# Patient Record
Sex: MALE | Race: WHITE | NOT HISPANIC OR LATINO
[De-identification: names, ages, dates, MRNs, and addresses within clinical notes are randomized per-mention and may not be internally consistent; named-entity substitution may affect disease eponyms.]

---

## 2018-08-31 ENCOUNTER — RX RENEWAL (OUTPATIENT)
Age: 63
End: 2018-08-31

## 2018-08-31 PROBLEM — Z00.00 ENCOUNTER FOR PREVENTIVE HEALTH EXAMINATION: Status: ACTIVE | Noted: 2018-08-31

## 2018-10-02 ENCOUNTER — RESULT CHARGE (OUTPATIENT)
Age: 63
End: 2018-10-02

## 2018-10-02 ENCOUNTER — APPOINTMENT (OUTPATIENT)
Dept: ENDOCRINOLOGY | Facility: CLINIC | Age: 63
End: 2018-10-02
Payer: COMMERCIAL

## 2018-10-02 VITALS
WEIGHT: 216 LBS | SYSTOLIC BLOOD PRESSURE: 130 MMHG | HEIGHT: 67 IN | BODY MASS INDEX: 33.9 KG/M2 | DIASTOLIC BLOOD PRESSURE: 80 MMHG | HEART RATE: 74 BPM

## 2018-10-02 DIAGNOSIS — Z86.39 PERSONAL HISTORY OF OTHER ENDOCRINE, NUTRITIONAL AND METABOLIC DISEASE: ICD-10-CM

## 2018-10-02 DIAGNOSIS — Z87.19 PERSONAL HISTORY OF OTHER DISEASES OF THE DIGESTIVE SYSTEM: ICD-10-CM

## 2018-10-02 LAB
GLUCOSE BLDC GLUCOMTR-MCNC: 153
HBA1C MFR BLD HPLC: 7.1
LDLC SERPL CALC-MCNC: 46
MICROALBUMIN/CREAT 24H UR-RTO: 9

## 2018-10-02 PROCEDURE — 99214 OFFICE O/P EST MOD 30 MIN: CPT | Mod: 25

## 2018-10-02 PROCEDURE — 82962 GLUCOSE BLOOD TEST: CPT

## 2018-11-07 ENCOUNTER — RX RENEWAL (OUTPATIENT)
Age: 63
End: 2018-11-07

## 2018-11-26 ENCOUNTER — RX RENEWAL (OUTPATIENT)
Age: 63
End: 2018-11-26

## 2018-12-06 ENCOUNTER — MEDICATION RENEWAL (OUTPATIENT)
Age: 63
End: 2018-12-06

## 2019-01-22 ENCOUNTER — RECORD ABSTRACTING (OUTPATIENT)
Age: 64
End: 2019-01-22

## 2019-01-22 DIAGNOSIS — Z80.9 FAMILY HISTORY OF MALIGNANT NEOPLASM, UNSPECIFIED: ICD-10-CM

## 2019-01-22 DIAGNOSIS — Z83.3 FAMILY HISTORY OF DIABETES MELLITUS: ICD-10-CM

## 2019-01-22 DIAGNOSIS — Z78.9 OTHER SPECIFIED HEALTH STATUS: ICD-10-CM

## 2019-01-22 DIAGNOSIS — Z87.891 PERSONAL HISTORY OF NICOTINE DEPENDENCE: ICD-10-CM

## 2019-01-22 DIAGNOSIS — Z83.49 FAMILY HISTORY OF OTHER ENDOCRINE, NUTRITIONAL AND METABOLIC DISEASES: ICD-10-CM

## 2019-01-22 DIAGNOSIS — N52.9 MALE ERECTILE DYSFUNCTION, UNSPECIFIED: ICD-10-CM

## 2019-02-05 ENCOUNTER — APPOINTMENT (OUTPATIENT)
Dept: ENDOCRINOLOGY | Facility: CLINIC | Age: 64
End: 2019-02-05

## 2019-04-02 ENCOUNTER — RESULT CHARGE (OUTPATIENT)
Age: 64
End: 2019-04-02

## 2019-04-02 ENCOUNTER — APPOINTMENT (OUTPATIENT)
Dept: ENDOCRINOLOGY | Facility: CLINIC | Age: 64
End: 2019-04-02
Payer: COMMERCIAL

## 2019-04-02 VITALS
WEIGHT: 207 LBS | BODY MASS INDEX: 32.49 KG/M2 | HEART RATE: 89 BPM | DIASTOLIC BLOOD PRESSURE: 76 MMHG | SYSTOLIC BLOOD PRESSURE: 130 MMHG | HEIGHT: 67 IN

## 2019-04-02 DIAGNOSIS — N52.9 MALE ERECTILE DYSFUNCTION, UNSPECIFIED: ICD-10-CM

## 2019-04-02 LAB — GLUCOSE BLDC GLUCOMTR-MCNC: 193

## 2019-04-02 PROCEDURE — 82962 GLUCOSE BLOOD TEST: CPT

## 2019-04-02 PROCEDURE — 99214 OFFICE O/P EST MOD 30 MIN: CPT | Mod: 25

## 2019-04-02 RX ORDER — SILDENAFIL 100 MG/1
100 TABLET, FILM COATED ORAL
Qty: 8 | Refills: 3 | Status: ACTIVE | COMMUNITY
Start: 2019-04-02 | End: 1900-01-01

## 2019-04-02 NOTE — ASSESSMENT
[FreeTextEntry1] : DM type 2, treated with insulin, trulicity and metformin, good control with improvement on the higher dose of trulicity\par Hyperlipidemia higher than usual due to lapse in atorvastatin use\par Hypertension stable\par renew sildenafil

## 2019-07-22 ENCOUNTER — RX RENEWAL (OUTPATIENT)
Age: 64
End: 2019-07-22

## 2019-08-05 LAB
LDLC SERPL DIRECT ASSAY-MCNC: 111
MICROALBUMIN/CREAT 24H UR-RTO: 11

## 2019-08-06 ENCOUNTER — APPOINTMENT (OUTPATIENT)
Dept: ENDOCRINOLOGY | Facility: CLINIC | Age: 64
End: 2019-08-06

## 2019-09-10 ENCOUNTER — OTHER (OUTPATIENT)
Age: 64
End: 2019-09-10

## 2020-01-31 LAB
HBA1C MFR BLD HPLC: 7.7
LDLC SERPL DIRECT ASSAY-MCNC: 38

## 2020-02-04 ENCOUNTER — APPOINTMENT (OUTPATIENT)
Dept: ENDOCRINOLOGY | Facility: CLINIC | Age: 65
End: 2020-02-04
Payer: COMMERCIAL

## 2020-02-04 ENCOUNTER — RESULT CHARGE (OUTPATIENT)
Age: 65
End: 2020-02-04

## 2020-02-04 VITALS
WEIGHT: 216 LBS | HEIGHT: 67 IN | BODY MASS INDEX: 33.9 KG/M2 | DIASTOLIC BLOOD PRESSURE: 70 MMHG | SYSTOLIC BLOOD PRESSURE: 130 MMHG

## 2020-02-04 LAB
GLUCOSE BLDC GLUCOMTR-MCNC: 143
MICROALBUMIN/CREAT 24H UR-RTO: 22

## 2020-02-04 PROCEDURE — 82962 GLUCOSE BLOOD TEST: CPT

## 2020-02-04 PROCEDURE — 99214 OFFICE O/P EST MOD 30 MIN: CPT | Mod: 25

## 2020-02-04 NOTE — ASSESSMENT
[FreeTextEntry1] : DM type 2, treated with insulin, trulicity and metformin, loss of control. WOuld benefit greatly from a gatito home\par Hyperlipidemia controlled\par Hypertension stable\par renew sildenafil\par \par Glucose Sensor Necessity:  This patient with diabetes performs 4 or more glucose checks per day utilizing a home blood glucose monitor.  The patient is treated with insulin via 3 or more injections daily.  This patient requires frequent adjustments to his insulin treatment on the basis of therapeutic continuous glucose monitoring results.  In addition, the patient has been to our office for an evaluation of their diabetes control within the past 6 months.  \par \par

## 2020-02-04 NOTE — HISTORY OF PRESENT ILLNESS
[FreeTextEntry1] : Quality:  Type 2.   \par Severity:  Moderate\par Duration:  27 \par Associated Symptoms:  neuropathy\par Modifying Factors:   \par Notes:  Current regimen:\par 	Metformin 500, 4 tabs daily\par 	Invokana 300 mg daily\par 	trulicity	1.5 (started 10/2017 with significant improvement)\par 	Lantus 30-35 units at bed time\par 	Humalog before meals \par 			6\par 		150-199	10\par 		200-249	12\par 		250-299	16\par 		300-349	20\par 		350+	24\par Eye exam September 2017- no DR, report in attachments\par on glucotrol, actos, victoza, byetta in past\par Tries to do 10,000 steps daily\par \par \par \par Weight History:  \par \par \par Blood Glucose Testing Information \par \par Patient is using the  meter and is testing 2-3 times per day.\par \par \par Past Medical History\par Diabetes. Cholesterol. \par Notes:  ED (hypogonadism ruled out)\par fatty liver\par

## 2020-05-12 ENCOUNTER — RX RENEWAL (OUTPATIENT)
Age: 65
End: 2020-05-12

## 2020-06-22 RX ORDER — PEN NEEDLE, DIABETIC 29 G X1/2"
31G X 8 MM NEEDLE, DISPOSABLE MISCELLANEOUS
Qty: 400 | Refills: 1 | Status: DISCONTINUED | COMMUNITY
Start: 2018-11-07 | End: 2020-06-22

## 2020-06-23 ENCOUNTER — APPOINTMENT (OUTPATIENT)
Dept: ENDOCRINOLOGY | Facility: CLINIC | Age: 65
End: 2020-06-23
Payer: COMMERCIAL

## 2020-06-23 VITALS
SYSTOLIC BLOOD PRESSURE: 140 MMHG | BODY MASS INDEX: 32.33 KG/M2 | HEIGHT: 67 IN | WEIGHT: 206 LBS | DIASTOLIC BLOOD PRESSURE: 84 MMHG

## 2020-06-23 DIAGNOSIS — E11.65 TYPE 2 DIABETES MELLITUS WITH HYPERGLYCEMIA: ICD-10-CM

## 2020-06-23 LAB — GLUCOSE BLDC GLUCOMTR-MCNC: 85

## 2020-06-23 PROCEDURE — 82962 GLUCOSE BLOOD TEST: CPT

## 2020-06-23 PROCEDURE — 99214 OFFICE O/P EST MOD 30 MIN: CPT | Mod: 25

## 2020-06-23 RX ORDER — FLASH GLUCOSE SENSOR
KIT MISCELLANEOUS
Qty: 6 | Refills: 3 | Status: DISCONTINUED | COMMUNITY
Start: 2020-02-04 | End: 2020-06-23

## 2020-06-23 NOTE — PHYSICAL EXAM
[Normal Rate] : heart rate was normal [Clear to Auscultation] : lungs were clear to auscultation bilaterally

## 2020-06-23 NOTE — HISTORY OF PRESENT ILLNESS
[FreeTextEntry1] : Quality:  Type 2.   \par Severity:  Moderate\par Duration:  27 \par Associated Symptoms:  neuropathy\par Modifying Factors:   \par Notes:  Current regimen:\par 	Metformin 500, 4 tabs daily\par 	Invokana 300 mg daily\par 	trulicity	1.5 (started 10/2017 with significant improvement)\par 	Lantus 30-35 units at bed time reduced to 25\par 	Humalog before meals \par 			6\par 		150-199	10\par 		200-249	12\par 		250-299	16\par 		300-349	20\par 		350+	24\par Eye exam September 2017- no DR, report in attachments\par on glucotrol, actos, victoza, byetta in past\par Tries to do 10,000 steps daily\par \par \par \par Weight History:  \par \par \par Blood Glucose Testing Information \par \par Patient is using the  meter and is testing 2-3 times per day.\par \par \par Past Medical History\par Diabetes. Cholesterol. \par Notes:  ED (hypogonadism ruled out)\par fatty liver\par s/p COVID

## 2020-06-23 NOTE — ASSESSMENT
[FreeTextEntry1] : DM type 2, treated with insulin, trulicity and metformin, transient loss of control due to illness. WOuld benefit greatly from a gatito home\par Hyperlipidemia controlled\par Hypertension stable\par renew sildenafil\par \par Glucose Sensor Necessity:  This patient with diabetes performs 4 or more glucose checks per day utilizing a home blood glucose monitor.  The patient is treated with insulin via 3 or more injections daily.  This patient requires frequent adjustments to his insulin treatment on the basis of therapeutic continuous glucose monitoring results.  In addition, the patient has been to our office for an evaluation of their diabetes control within the past 6 months.  \par Humalog before meals \par 			6\par 		150-199	10\par 		200-249	12\par 		250-299	16\par 		300-349	20\par 		350+	24\par

## 2020-10-06 ENCOUNTER — RX RENEWAL (OUTPATIENT)
Age: 65
End: 2020-10-06

## 2020-11-03 ENCOUNTER — APPOINTMENT (OUTPATIENT)
Dept: ENDOCRINOLOGY | Facility: CLINIC | Age: 65
End: 2020-11-03
Payer: COMMERCIAL

## 2020-11-03 VITALS
HEIGHT: 67 IN | SYSTOLIC BLOOD PRESSURE: 120 MMHG | BODY MASS INDEX: 29.82 KG/M2 | WEIGHT: 190 LBS | DIASTOLIC BLOOD PRESSURE: 70 MMHG

## 2020-11-03 PROCEDURE — 99072 ADDL SUPL MATRL&STAF TM PHE: CPT

## 2020-11-03 PROCEDURE — 99214 OFFICE O/P EST MOD 30 MIN: CPT

## 2020-11-03 NOTE — ASSESSMENT
[FreeTextEntry1] : significant improvement in glycemic control with diet changes. No longer using insulin\par hyperlipidemia very well controlled

## 2020-11-03 NOTE — HISTORY OF PRESENT ILLNESS
[FreeTextEntry1] : Quality:  Type 2.   \par Severity:  Moderate\par Duration:  27 \par Associated Symptoms:  neuropathy\par Modifying Factors:   \par Notes:  Current regimen:\par 	Metformin 500, 4 tabs daily\par 	Invokana 300 mg daily\par 	trulicity	1.5 (started 10/2017 with significant improvement)\par 	Lantus 30-35 units at bed time reduced to 25 - no longer needed\par 	Humalog before meals -not needed\par 			6\par 		150-199	10\par 		200-249	12\par 		250-299	16\par 		300-349	20\par 		350+	24\par Eye exam September 2017- no DR, report in attachments\par on glucotrol, actos, victoza, byetta in past\par Tries to do 10,000 steps daily\par Using a gatito, which has led to major improvement in diet\par \par \par Weight History:  recent weight loss\par \par \par \par \par \par \par Past Medical History\par Diabetes. Cholesterol. \par Notes:  ED (hypogonadism ruled out)\par fatty liver\par s/p COVID

## 2021-02-04 ENCOUNTER — RX RENEWAL (OUTPATIENT)
Age: 66
End: 2021-02-04

## 2021-03-02 ENCOUNTER — APPOINTMENT (OUTPATIENT)
Dept: ENDOCRINOLOGY | Facility: CLINIC | Age: 66
End: 2021-03-02

## 2021-04-13 ENCOUNTER — APPOINTMENT (OUTPATIENT)
Dept: ENDOCRINOLOGY | Facility: CLINIC | Age: 66
End: 2021-04-13
Payer: COMMERCIAL

## 2021-04-13 VITALS
DIASTOLIC BLOOD PRESSURE: 80 MMHG | SYSTOLIC BLOOD PRESSURE: 140 MMHG | WEIGHT: 189 LBS | HEIGHT: 67 IN | BODY MASS INDEX: 29.66 KG/M2

## 2021-04-13 LAB — GLUCOSE BLDC GLUCOMTR-MCNC: 92

## 2021-04-13 PROCEDURE — 82962 GLUCOSE BLOOD TEST: CPT

## 2021-04-13 PROCEDURE — 99214 OFFICE O/P EST MOD 30 MIN: CPT | Mod: 25

## 2021-04-13 PROCEDURE — 99072 ADDL SUPL MATRL&STAF TM PHE: CPT

## 2021-04-13 NOTE — HISTORY OF PRESENT ILLNESS
[FreeTextEntry1] : Quality:  Type 2.   \par Severity:  Moderate\par Duration:  28 \par Associated Symptoms:  neuropathy\par Modifying Factors:   \par Notes:  Current regimen:\par 	Metformin 500, 4 tabs daily\par 	Invokana 300 mg daily\par 	trulicity	1.5 (started 10/2017 with significant improvement)\par 	Lantus 30-35 units at bed time reduced to 25 - no longer needed\par 	Humalog before meals -not needed\par 			6\par 		150-199	10\par 		200-249	12\par 		250-299	16\par 		300-349	20\par 		350+	24\par Eye exam September 2017- no DR, report in attachments\par on glucotrol, actos, victoza, byetta in past\par Tries to do 10,000 steps daily\par Using a gatito, which has led to major improvement in diet\par \par Weight History:  recent weight loss\par \par \par Past Medical History\par Diabetes. Cholesterol. \par Notes:  ED (hypogonadism ruled out)\par fatty liver\par s/p COVID

## 2021-05-25 ENCOUNTER — RX RENEWAL (OUTPATIENT)
Age: 66
End: 2021-05-25

## 2021-08-06 ENCOUNTER — RX RENEWAL (OUTPATIENT)
Age: 66
End: 2021-08-06

## 2021-08-27 ENCOUNTER — RX RENEWAL (OUTPATIENT)
Age: 66
End: 2021-08-27

## 2021-09-01 LAB
HBA1C MFR BLD HPLC: 7.6
LDLC SERPL DIRECT ASSAY-MCNC: 41
MICROALBUMIN/CREAT 24H UR-RTO: 5.6

## 2021-09-02 ENCOUNTER — APPOINTMENT (OUTPATIENT)
Dept: ENDOCRINOLOGY | Facility: CLINIC | Age: 66
End: 2021-09-02
Payer: COMMERCIAL

## 2021-09-02 VITALS
BODY MASS INDEX: 28.79 KG/M2 | WEIGHT: 190 LBS | DIASTOLIC BLOOD PRESSURE: 70 MMHG | SYSTOLIC BLOOD PRESSURE: 124 MMHG | OXYGEN SATURATION: 96 % | HEIGHT: 68 IN | HEART RATE: 73 BPM

## 2021-09-02 DIAGNOSIS — R35.0 FREQUENCY OF MICTURITION: ICD-10-CM

## 2021-09-02 LAB — GLUCOSE BLDC GLUCOMTR-MCNC: 134

## 2021-09-02 PROCEDURE — 99214 OFFICE O/P EST MOD 30 MIN: CPT | Mod: 25

## 2021-09-02 PROCEDURE — 82962 GLUCOSE BLOOD TEST: CPT

## 2021-09-02 RX ORDER — PEN NEEDLE, DIABETIC 29 G X1/2"
31G X 8 MM NEEDLE, DISPOSABLE MISCELLANEOUS
Qty: 400 | Refills: 3 | Status: DISCONTINUED | COMMUNITY
End: 2021-09-02

## 2021-09-02 RX ORDER — INSULIN LISPRO 100 [IU]/ML
100 INJECTION, SOLUTION INTRAVENOUS; SUBCUTANEOUS
Qty: 60 | Refills: 2 | Status: DISCONTINUED | COMMUNITY
Start: 2019-07-22 | End: 2021-09-02

## 2021-09-02 RX ORDER — INSULIN GLARGINE 100 [IU]/ML
100 INJECTION, SOLUTION SUBCUTANEOUS
Qty: 3 | Refills: 3 | Status: DISCONTINUED | COMMUNITY
End: 2021-09-02

## 2021-09-02 RX ORDER — ATORVASTATIN CALCIUM 40 MG/1
40 TABLET, FILM COATED ORAL
Qty: 90 | Refills: 2 | Status: DISCONTINUED | COMMUNITY
Start: 2021-02-04 | End: 2021-09-02

## 2021-09-02 NOTE — ASSESSMENT
[FreeTextEntry1] : DM type 2, mild loss of control. Brief lapse in trulicity rx and change in lifestyle reasons for the change. No need for additional drug rx\par hyperlipidemia on statin therapy\par BP stable

## 2021-12-06 ENCOUNTER — APPOINTMENT (OUTPATIENT)
Dept: ENDOCRINOLOGY | Facility: CLINIC | Age: 66
End: 2021-12-06
Payer: COMMERCIAL

## 2021-12-06 VITALS
WEIGHT: 199 LBS | HEART RATE: 76 BPM | SYSTOLIC BLOOD PRESSURE: 122 MMHG | OXYGEN SATURATION: 98 % | HEIGHT: 66 IN | BODY MASS INDEX: 31.98 KG/M2 | DIASTOLIC BLOOD PRESSURE: 60 MMHG

## 2021-12-06 LAB
GLUCOSE BLDC GLUCOMTR-MCNC: 113
HBA1C MFR BLD HPLC: 6.9
LDLC SERPL DIRECT ASSAY-MCNC: 61
MICROALBUMIN/CREAT 24H UR-RTO: 13

## 2021-12-06 PROCEDURE — 99072 ADDL SUPL MATRL&STAF TM PHE: CPT

## 2021-12-06 PROCEDURE — 82962 GLUCOSE BLOOD TEST: CPT

## 2021-12-06 PROCEDURE — 99214 OFFICE O/P EST MOD 30 MIN: CPT | Mod: 25

## 2021-12-29 RX ORDER — CANAGLIFLOZIN 300 MG/1
300 TABLET, FILM COATED ORAL
Qty: 90 | Refills: 3 | Status: DISCONTINUED | COMMUNITY
Start: 2018-11-26 | End: 2021-12-29

## 2022-01-06 ENCOUNTER — APPOINTMENT (OUTPATIENT)
Dept: ENDOCRINOLOGY | Facility: CLINIC | Age: 67
End: 2022-01-06

## 2022-03-23 LAB
HBA1C MFR BLD HPLC: 7.3
LDLC SERPL DIRECT ASSAY-MCNC: 55
MICROALBUMIN/CREAT 24H UR-RTO: 16

## 2022-03-24 ENCOUNTER — APPOINTMENT (OUTPATIENT)
Dept: ENDOCRINOLOGY | Facility: CLINIC | Age: 67
End: 2022-03-24
Payer: COMMERCIAL

## 2022-03-24 VITALS
HEART RATE: 69 BPM | HEIGHT: 66 IN | OXYGEN SATURATION: 98 % | SYSTOLIC BLOOD PRESSURE: 118 MMHG | WEIGHT: 192 LBS | DIASTOLIC BLOOD PRESSURE: 66 MMHG | BODY MASS INDEX: 30.86 KG/M2

## 2022-03-24 DIAGNOSIS — E11.9 TYPE 2 DIABETES MELLITUS W/OUT COMPLICATIONS: ICD-10-CM

## 2022-03-24 LAB — GLUCOSE BLDC GLUCOMTR-MCNC: 150

## 2022-03-24 PROCEDURE — 99214 OFFICE O/P EST MOD 30 MIN: CPT | Mod: 25

## 2022-03-24 PROCEDURE — 82962 GLUCOSE BLOOD TEST: CPT

## 2022-03-24 PROCEDURE — 99072 ADDL SUPL MATRL&STAF TM PHE: CPT

## 2022-06-12 NOTE — HISTORY OF PRESENT ILLNESS
[FreeTextEntry1] : Quality:  Type 2.   \par Severity:  Moderate\par Duration:  29 \par Associated Symptoms:  neuropathy\par Modifying Factors:   \par Notes:  Current regimen:\par 	Metformin 500, 4 tabs daily\par 	jardiance 25\par 	trulicity	1.5 (started 10/2017 with significant improvement)\par 	Lantus 30-35 units at bed time reduced to 25 - no longer needed\par 	Humalog before meals -infrequently used \par 			6\par 		150-199	10\par 		200-249	12\par 		250-299	16\par 		300-349	20\par 		350+	24\par Eye exam September 2017- no DR, report in attachments\par on glucotrol, actos, victoza, byetta in past\par Tries to do 10,000 steps daily\par Using a gatito, which has led to major improvement in diet\par \par Weight History:  recent weight loss\par \par \par Past Medical History\par Diabetes. Cholesterol. \par Notes:  ED (hypogonadism ruled out)\par fatty liver\par s/p COVID

## 2022-06-12 NOTE — ASSESSMENT
[FreeTextEntry1] : DM type 2, mild rise in A1C due to lifestyle. Discussed\par hyperlipidemia on statin therapy\par BP stable\par \par Glucose Sensor Necessity:  This patient with diabetes (dx: E11.65)  is currently using a Kevin continuous glucose monitor.  The patient is treated with insulin via four injections daily.  This patient requires frequent adjustments to his insulin treatment on the basis of therapeutic continuous glucose monitoring results by either adjusting fixed doses or sliding scale insulin.  In addition, the patient has been to our office for an evaluation of his diabetes control within the past 6 months.  \par \par \par

## 2022-07-28 ENCOUNTER — APPOINTMENT (OUTPATIENT)
Dept: ENDOCRINOLOGY | Facility: CLINIC | Age: 67
End: 2022-07-28

## 2022-10-27 LAB
HBA1C MFR BLD HPLC: 9.1
LDLC SERPL DIRECT ASSAY-MCNC: 81
MICROALBUMIN/CREAT UR-RTO: 20

## 2022-10-28 ENCOUNTER — APPOINTMENT (OUTPATIENT)
Dept: ENDOCRINOLOGY | Facility: CLINIC | Age: 67
End: 2022-10-28

## 2022-10-28 VITALS
OXYGEN SATURATION: 98 % | WEIGHT: 200 LBS | HEART RATE: 75 BPM | DIASTOLIC BLOOD PRESSURE: 62 MMHG | SYSTOLIC BLOOD PRESSURE: 128 MMHG | HEIGHT: 66 IN | BODY MASS INDEX: 32.14 KG/M2

## 2022-10-28 LAB — GLUCOSE BLDC GLUCOMTR-MCNC: 275

## 2022-10-28 PROCEDURE — 99214 OFFICE O/P EST MOD 30 MIN: CPT | Mod: 25

## 2022-10-28 PROCEDURE — 82962 GLUCOSE BLOOD TEST: CPT

## 2022-10-28 NOTE — HISTORY OF PRESENT ILLNESS
[FreeTextEntry1] : Quality:  Type 2.   \par Severity:  Moderate\par Duration:  29 \par Associated Symptoms:  neuropathy\par Modifying Factors:   \par Notes:  Current regimen:\par 	Metformin 500, 4 tabs daily\par 	jardiance 25\par 	trulicity	1.5 (started 10/2017 with significant improvement)\par 	Lantus 30-35 units at bed time reduced to 25 - no longer needed\par 	Humalog before meals -infrequently used \par 			6\par 		150-199	10\par 		200-249	12\par 		250-299	16\par 		300-349	20\par 		350+	24\par Eye exam September 2017- no DR, report in attachments\par on glucotrol, actos, victoza, byetta in past\par Tries to do 10,000 steps daily\par Using a gatito, which has led to major improvement in diet\par \par Weight History:  recent weight loss\par \par \par Past Medical History\par Diabetes. Cholesterol. \par Notes:  ED (hypogonadism ruled out)\par fatty liver. FIB -4 1.1, low risk\par s/p COVID

## 2022-10-28 NOTE — ASSESSMENT
[FreeTextEntry1] : DM type 2, loss of control. needs to resume using gatito; may need basal insulin. For now increase trulicity to 3 mg weekly\par hyperlipidemia on statin therapy\par BP stable\par \par

## 2023-01-19 LAB
HBA1C MFR BLD HPLC: 8.3
LDLC SERPL DIRECT ASSAY-MCNC: 56
MICROALBUMIN/CREAT 24H UR-RTO: 14

## 2023-01-20 ENCOUNTER — RESULT CHARGE (OUTPATIENT)
Age: 68
End: 2023-01-20

## 2023-01-20 ENCOUNTER — APPOINTMENT (OUTPATIENT)
Dept: ENDOCRINOLOGY | Facility: CLINIC | Age: 68
End: 2023-01-20
Payer: COMMERCIAL

## 2023-01-20 VITALS
HEIGHT: 66 IN | HEART RATE: 72 BPM | WEIGHT: 190 LBS | SYSTOLIC BLOOD PRESSURE: 130 MMHG | DIASTOLIC BLOOD PRESSURE: 80 MMHG | BODY MASS INDEX: 30.53 KG/M2 | OXYGEN SATURATION: 98 %

## 2023-01-20 VITALS — TEMPERATURE: 97 F

## 2023-01-20 LAB — GLUCOSE BLDC GLUCOMTR-MCNC: 166

## 2023-01-20 PROCEDURE — 95251 CONT GLUC MNTR ANALYSIS I&R: CPT

## 2023-01-20 PROCEDURE — 99214 OFFICE O/P EST MOD 30 MIN: CPT | Mod: 25

## 2023-01-20 PROCEDURE — 82962 GLUCOSE BLOOD TEST: CPT

## 2023-02-22 ENCOUNTER — NON-APPOINTMENT (OUTPATIENT)
Age: 68
End: 2023-02-22

## 2023-03-08 NOTE — REVIEW OF SYSTEMS
[Recent Weight Loss (___ Lbs)] : recent weight loss: [unfilled] lbs [Pain/Numbness of Digits] : pain/numbness of digits [Chest Pain] : no chest pain [Palpitations] : no palpitations [Shortness Of Breath] : no shortness of breath [Nausea] : no nausea [Abdominal Pain] : no abdominal pain [Vomiting] : no vomiting [Polyuria] : no polyuria [Polydipsia] : no polydipsia

## 2023-03-08 NOTE — PHYSICAL EXAM
[Alert] : alert [Well Nourished] : well nourished [No Acute Distress] : no acute distress [Well Developed] : well developed [Normal Sclera/Conjunctiva] : normal sclera/conjunctiva [EOMI] : extra ocular movement intact [No Proptosis] : no proptosis [Thyroid Not Enlarged] : the thyroid was not enlarged [No Thyroid Nodules] : no palpable thyroid nodules [No Respiratory Distress] : no respiratory distress [No Accessory Muscle Use] : no accessory muscle use [Clear to Auscultation] : lungs were clear to auscultation bilaterally [Normal S1, S2] : normal S1 and S2 [Normal Rate] : heart rate was normal [Regular Rhythm] : with a regular rhythm [No Edema] : no peripheral edema [Normal Anterior Cervical Nodes] : no anterior cervical lymphadenopathy [Normal Posterior Cervical Nodes] : no posterior cervical lymphadenopathy [Normal Strength/Tone] : muscle strength and tone were normal [Oriented x3] : oriented to person, place, and time [Normal Affect] : the affect was normal [Normal Mood] : the mood was normal [Acanthosis Nigricans] : no acanthosis nigricans

## 2023-03-08 NOTE — ASSESSMENT
[FreeTextEntry1] : 67 year old male with T2DM, hypertension, and hyperlipidemia. Glycemic control has improved but remains suboptimal.\par \par 1. T2DM- A1c 8.3% with sharp spike in BG seen with meal around 10am.\par -Continue Metformin, Jardiance, and Trulicity.\par -Given post prandial rise, suggest Huamlog 4 units AC with meal at 10am.\par -Increase SMBG with Freestyle Kevin.\par -Work on lifestyle modifications- diet, exercise, and weight loss- to improve glycemic control.\par -Will download CGM in one month. Can consider prandial insulin with other meals or basal insulin depending on glucose patterns.\par -Repeat A1c and RTO for follow-up with MD in 3 months.\par \par 2. Hypertension- controlled.\par -Continue ACE inhibitor.\par \par 3. Hyperlipidemia- LDL at target.\par -Continue statin.\par -Repeat lipids in 3 months. \par \par Glucose sensor necessity: This patient with diabetes performs four or more glucose checks per day utilizing a home blood glucose monitor. The patient is treated with insulin via three or more injections daily (or a subcutaneous insulin infusion pump). This patient requires frequent adjustments to their insulin treatment on the basis of therapeutic continuous glucose monitoring results. In addition, the patient has been to our office for an evaluation of their diabetes control within the past six months. \par \par \par

## 2023-03-08 NOTE — HISTORY OF PRESENT ILLNESS
[FreeTextEntry1] : Quality: Type 2. \par Severity: Moderate\par Duration: 29 \par Associated Symptoms: neuropathy\par Modifying Factors: \par Notes: Current regimen:\par 	Metformin 500 mg, 4 tabs daily\par 	Jardiance 25 mg daily\par 	Trulicity 3 mg (started 10/2017 with significant improvement)\par 	Lantus 30-35 units at bed time reduced to 25 - now no longer taking\par 	Humalog before meals - has not taken in months\par 			6\par 		150-199	10\par 		200-249	12\par 		250-299	16\par 		300-349	20\par 		350+	24\par \par Eye exam December 2022, no DR, report in attachments\par Foot exam: November 2022, +neuropathy\par Kidney disease: none\par Heart disease: none\par \par SMBG with CGM. FreeStyle Kevin initially led to major improvement in diet.\par Reviewed CGMS. \par Avg \par CGM active 22% of the time\par CV 41.1%\par Very high 15%\par High 30%\par Target 50%\par Low 5%\par Very low 0%\par \par Interpretation: limited data, but sharp post prandial spike noted around 10am\par \par Weight: lost 10 pounds\par Diet: sometimes cheats with cake but overall thinks he is eating better. No change in appetite with dose increase in Trulicity.\par B- pancakes, 2 bagels when cheating. Normally has banana and then yogurt for breakfast\par L- skips\par D- varies, chicken, black beans, hamburger\par Exercise: at work only, tries to do 10,000 steps daily\par \par Past Medical History\par Diabetes. Cholesterol. \par Notes: ED (hypogonadism ruled out)\par fatty liver. FIB -4 1.1, low risk\par s/p COVID \par

## 2023-04-28 ENCOUNTER — APPOINTMENT (OUTPATIENT)
Dept: ENDOCRINOLOGY | Facility: CLINIC | Age: 68
End: 2023-04-28
Payer: COMMERCIAL

## 2023-04-28 VITALS
WEIGHT: 190 LBS | DIASTOLIC BLOOD PRESSURE: 60 MMHG | SYSTOLIC BLOOD PRESSURE: 120 MMHG | BODY MASS INDEX: 30.53 KG/M2 | OXYGEN SATURATION: 98 % | HEART RATE: 72 BPM | HEIGHT: 66 IN

## 2023-04-28 LAB — GLUCOSE BLDC GLUCOMTR-MCNC: 181

## 2023-04-28 PROCEDURE — 82962 GLUCOSE BLOOD TEST: CPT

## 2023-04-28 PROCEDURE — 99214 OFFICE O/P EST MOD 30 MIN: CPT | Mod: 25

## 2023-04-28 NOTE — ASSESSMENT
[FreeTextEntry1] : DM type 2, loss of control. needs to resume using gatito; may need basal insulin. \par hyperlipidemia on statin therapy\par BP stable\par \par fetch a gatito report in several weeks

## 2023-04-28 NOTE — HISTORY OF PRESENT ILLNESS
[FreeTextEntry1] : Quality:  Type 2.   \par Severity:  Moderate\par Duration:  30 \par Associated Symptoms:  neuropathy\par Modifying Factors:   \par Notes:  Current regimen:\par 	Metformin 500, 4 tabs daily\par 	jardiance 25\par 	trulicity	3 (started 10/2017 with significant improvement)\par 	Lantus 30-35 units at bed time reduced to 25 - no longer needed\par 	Humalog before meals -infrequently used \par 			6\par 		150-199	10\par 		200-249	12\par 		250-299	16\par 		300-349	20\par 		350+	24\par Eye exam September 2017- no DR, report in attachments\par on glucotrol, actos, victoza, byetta in past\par Tries to do 10,000 steps daily\par Using a gatito, which has led to major improvement in diet\par \par Weight History:  recent weight loss\par \par \par Past Medical History\par Diabetes. Cholesterol. \par Notes:  ED (hypogonadism ruled out)\par fatty liver. FIB -4 1.1, low risk\par s/p COVID

## 2023-07-20 ENCOUNTER — APPOINTMENT (OUTPATIENT)
Dept: ORTHOPEDIC SURGERY | Facility: CLINIC | Age: 68
End: 2023-07-20
Payer: OTHER MISCELLANEOUS

## 2023-07-20 VITALS — BODY MASS INDEX: 29.73 KG/M2 | WEIGHT: 185 LBS | HEIGHT: 66 IN

## 2023-07-20 PROCEDURE — 99203 OFFICE O/P NEW LOW 30 MIN: CPT | Mod: ACP

## 2023-07-20 PROCEDURE — 73030 X-RAY EXAM OF SHOULDER: CPT | Mod: LT

## 2023-07-20 PROCEDURE — 73010 X-RAY EXAM OF SHOULDER BLADE: CPT | Mod: LT

## 2023-07-24 NOTE — HISTORY OF PRESENT ILLNESS
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 Pt reports of sustaining a injury while lifting a box of meat on DOI noted above.  [de-identified] : [employer].

## 2023-07-24 NOTE — ASSESSMENT
[FreeTextEntry1] : The patient is a 69 yo man presenting with left shoulder pain directly related to a work related injury on 6/20/23. He works at [employer] and was lifting a heavy box of meat to shoulder height. He felt and heard a pop with lifting. He now has pain with overuse and repetitive activity. He denies any distinct ROM or strength loss. He can complete ADLs as needed. He has increased pain with continuous activity. The patient uses advil as needed. He denies any numbness. His is RHD. The patient is fully working. \par \par Left shoulder exam: The patient presents with no apparent distress. Neurovascularly intact. Sensation intact to left upper extremity. No scars, rashes, or abrasions. 2+ pulses to the distal radius.Tender to palpation at antetolateral shoulder.AROM   ABD 90 ER 60 with pain IR L5. 4+/5 RC strength. 5/5 Deltoid strength. +Neer. +Montelongo. \par \par Left shoulder xrays taken today in office, 5 views WB - No fractures or loose bodies. No GHOA. No AC OA. GH joint is reduced. No obvious tumors, masses, or lesions. \par \par The patient will go for MRI of the left shoulder to r/o RCT. He will call once study is completed. He will continue to work full duty.

## 2023-08-03 ENCOUNTER — APPOINTMENT (OUTPATIENT)
Dept: ORTHOPEDIC SURGERY | Facility: CLINIC | Age: 68
End: 2023-08-03
Payer: OTHER MISCELLANEOUS

## 2023-08-03 VITALS — HEIGHT: 66 IN | WEIGHT: 185 LBS | BODY MASS INDEX: 29.73 KG/M2

## 2023-08-03 DIAGNOSIS — M25.819 OTHER SPECIFIED JOINT DISORDERS, UNSPECIFIED SHOULDER: ICD-10-CM

## 2023-08-03 LAB
HBA1C MFR BLD HPLC: 8.5
LDLC SERPL DIRECT ASSAY-MCNC: 62
MICROALBUMIN/CREAT 24H UR-RTO: 8

## 2023-08-03 PROCEDURE — 99213 OFFICE O/P EST LOW 20 MIN: CPT | Mod: ACP

## 2023-08-03 PROCEDURE — 73010 X-RAY EXAM OF SHOULDER BLADE: CPT | Mod: RT

## 2023-08-03 PROCEDURE — 73030 X-RAY EXAM OF SHOULDER: CPT | Mod: RT

## 2023-08-04 ENCOUNTER — APPOINTMENT (OUTPATIENT)
Dept: ENDOCRINOLOGY | Facility: CLINIC | Age: 68
End: 2023-08-04
Payer: COMMERCIAL

## 2023-08-04 VITALS
BODY MASS INDEX: 29.25 KG/M2 | SYSTOLIC BLOOD PRESSURE: 135 MMHG | HEART RATE: 91 BPM | DIASTOLIC BLOOD PRESSURE: 80 MMHG | HEIGHT: 66 IN | WEIGHT: 182 LBS | OXYGEN SATURATION: 97 %

## 2023-08-04 LAB — GLUCOSE BLDC GLUCOMTR-MCNC: 264

## 2023-08-04 PROCEDURE — 82962 GLUCOSE BLOOD TEST: CPT

## 2023-08-04 PROCEDURE — 99214 OFFICE O/P EST MOD 30 MIN: CPT | Mod: 25

## 2023-08-04 RX ORDER — PEN NEEDLE, DIABETIC 29 G X1/2"
31G X 5 MM NEEDLE, DISPOSABLE MISCELLANEOUS
Qty: 1 | Refills: 3 | Status: ACTIVE | COMMUNITY
Start: 2022-03-24 | End: 1900-01-01

## 2023-08-04 NOTE — PHYSICAL EXAM
[Alert] : alert [Well Nourished] : well nourished [No Acute Distress] : no acute distress [Well Developed] : well developed [Normal Sclera/Conjunctiva] : normal sclera/conjunctiva [EOMI] : extra ocular movement intact [No Proptosis] : no proptosis [Thyroid Not Enlarged] : the thyroid was not enlarged [No Thyroid Nodules] : no palpable thyroid nodules [No Respiratory Distress] : no respiratory distress [No Accessory Muscle Use] : no accessory muscle use [Clear to Auscultation] : lungs were clear to auscultation bilaterally [Normal S1, S2] : normal S1 and S2 [Normal Rate] : heart rate was normal [Regular Rhythm] : with a regular rhythm [No Edema] : no peripheral edema [Normal Anterior Cervical Nodes] : no anterior cervical lymphadenopathy [Normal Strength/Tone] : muscle strength and tone were normal [Oriented x3] : oriented to person, place, and time [Normal Affect] : the affect was normal [Normal Mood] : the mood was normal [Acanthosis Nigricans] : no acanthosis nigricans

## 2023-08-04 NOTE — ASSESSMENT
[FreeTextEntry1] : 68 year old male with T2DM, hypertension, and hyperlipidemia. Glycemic control has improved but remains suboptimal.  1. T2DM- A1c 8.5% with sharp spike in BG seen with meal around 10am on two days, but no other data available. -Continue Metformin, Jardiance, and Trulicity. -Advised use of prior sliding scale. Take 6 units AC for BG <150 and then increase according to above scale. Written instructions provided. -Increase SMBG with Freestyle Kevin. Must test AT LEAST q 8 hours and before each meal! -Work on lifestyle modifications- diet, exercise, and weight loss- to improve glycemic control. -Will download CGM in one week. Can consider basal insulin depending on glucose patterns. -Repeat A1c and RTO for follow-up with MD in 3 months.  2. Hypertension- controlled. -Continue ACE inhibitor.  3. Hyperlipidemia- LDL at target. -Continue statin. -Repeat lipids in 3 months.  Glucose sensor necessity: This patient with diabetes performs four or more glucose checks per day utilizing a home blood glucose monitor. The patient is treated with insulin via three or more injections daily (or a subcutaneous insulin infusion pump). This patient requires frequent adjustments to their insulin treatment on the basis of therapeutic continuous glucose monitoring results. In addition, the patient has been to our office for an evaluation of their diabetes control within the past six months.

## 2023-08-04 NOTE — HISTORY OF PRESENT ILLNESS
[FreeTextEntry1] : Quality: Type 2. Severity: Moderate Duration: 30 Associated Symptoms: neuropathy  Modifying Factors: Current regimen:  Metformin 500, 4 tabs daily  Jardiance 25 mg  Trulicity 3 mg (started 10/2017 with significant improvement)  Lantus 30-35 units at bed time reduced to 25 - no longer needed  Humalog before meals: 4 units with breakfast and 6 units with dinner. Usually skips lunch. Has not been following sliding scale, does not test before meals.    6   150-199 10   200-249 12   250-299 16   300-349 20   350+ 24 Past meds: Glucotrol, Actos, Victoza, Byetta  Current B, ate Palestinian toast for breakfast 1.5 hours ago Infrequent SMBG with CGM. Reviewed download, limited data, only active 10% of the time. Patient goes days without scanning. The few days of data available reveals post prandial spikes, but difficult to discern if this is a pattern.  Eye exam 2022, no DR, report in attachments Foot exam: 2023, +neuropathy Kidney disease: none Heart disease: none  Exercise: active at work, (meat manger, [employer] trucks) Diet: varies B- coffee with banana and yogurt L- skips D- fish or steaks with vegetables (salad, asparagus, spinach, broccoli) and rice S- donut, pudding, sugar free Jello Weight: recent weight loss, 10 pounds in the past 3-4 months  Past Medical History Cholesterol. ED (hypogonadism ruled out) fatty liver. FIB -4 1.1, low risk s/p COVID

## 2023-08-07 PROBLEM — M25.819 IMPINGEMENT OF SHOULDER: Status: ACTIVE | Noted: 2023-08-07

## 2023-08-07 NOTE — ASSESSMENT
[FreeTextEntry1] : The patient is here for right shoulder pain directly related to a work accident on 7/7/23. He is still working full time. He was lifting boxes of meat and the box ripped, causing him to lose his  on his left hand. His right side was left holding the boxes. He felt his arm sublux in and out of the joint. Since that time he has had moderate pain and decreased ROM with ADLs and lifting activities. He denies trauma since then. He denies paresthesias. His pain is anterolateral in nature. He has pain at night but not at rest. He has tried aleve with some relief. He wants to consider PT prior to any injection.   Right shoulder exam: The patient presents with no apparent distress. Neurovascularly intact. Sensation intact to right upper extremity. No scars, rashes, or abrasions. 2+ pulses to the distal radius.Tender to palpation at anterolateral shoulder and supraspinatus .AROM   with pain  ABD 90 with pain ER 60 with pain IR L5. 4/5 RC strength. 5/5 Deltoid strength. +Neer. +Montelongo. -Speeds.   Right shoulder xrays taken today in office, 5 views WB - No GHOA. NO AC OA. GH joint is reduced without signs of dislocation. No fractures or loose bodies. Type III acromion. No obvious tumors, masses, or lesions.   The patient has a right shoulder impingement syndrome and possible rotator cuff tear. He will go for PT and MRI to r/o RCT once it is approved by . It was submitted today.

## 2023-08-07 NOTE — HISTORY OF PRESENT ILLNESS
[Work related] : work related [Full time] : Work status: full time [8] : 8 [0] : 0 [Sharp] : sharp [Intermittent] : intermittent [Rest] : rest [] : no [FreeTextEntry1] : Rt. 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 Pt reports of possible sublux. on DOI noted above while lifting a heavy box at work. No use of NSAIDs. No prior tx.  [de-identified] : [employer].

## 2023-09-05 ENCOUNTER — APPOINTMENT (OUTPATIENT)
Dept: MRI IMAGING | Facility: CLINIC | Age: 68
End: 2023-09-05
Payer: OTHER MISCELLANEOUS

## 2023-09-05 PROCEDURE — 73221 MRI JOINT UPR EXTREM W/O DYE: CPT | Mod: RT

## 2023-09-05 PROCEDURE — 73221 MRI JOINT UPR EXTREM W/O DYE: CPT | Mod: LT

## 2023-09-06 DIAGNOSIS — M25.462 EFFUSION, LEFT KNEE: ICD-10-CM

## 2023-11-30 LAB
HBA1C MFR BLD HPLC: 8.4
LDLC SERPL DIRECT ASSAY-MCNC: 34
MICROALBUMIN/CREAT 24H UR-RTO: 13

## 2023-12-01 ENCOUNTER — APPOINTMENT (OUTPATIENT)
Dept: ENDOCRINOLOGY | Facility: CLINIC | Age: 68
End: 2023-12-01
Payer: COMMERCIAL

## 2023-12-01 VITALS
OXYGEN SATURATION: 98 % | BODY MASS INDEX: 29.57 KG/M2 | DIASTOLIC BLOOD PRESSURE: 60 MMHG | WEIGHT: 184 LBS | HEART RATE: 82 BPM | HEIGHT: 66 IN | SYSTOLIC BLOOD PRESSURE: 132 MMHG

## 2023-12-01 DIAGNOSIS — E11.65 TYPE 2 DIABETES MELLITUS WITH HYPERGLYCEMIA: ICD-10-CM

## 2023-12-01 LAB — GLUCOSE BLDC GLUCOMTR-MCNC: 210

## 2023-12-01 PROCEDURE — 99214 OFFICE O/P EST MOD 30 MIN: CPT | Mod: 25

## 2023-12-01 PROCEDURE — 82962 GLUCOSE BLOOD TEST: CPT

## 2024-01-16 NOTE — HISTORY OF PRESENT ILLNESS
[FreeTextEntry1] : Quality: Type 2. Severity: Moderate Duration: 30 Associated Symptoms: neuropathy  Modifying Factors: Current regimen:  Metformin 500, 4 tabs daily  Jardiance 25 mg  Trulicity 3 mg (started 10/2017 with significant improvement)  Lantus 30-35 units at bed time reduced to 25 - no longer needed  Humalog before meals: 4 units with breakfast and 6 units with dinner. Usually skips lunch. Has not been following sliding scale, does not test before meals.    6   150-199 10   200-249 12   250-299 16   300-349 20   350+ 24 Past meds: Glucotrol, Actos, Victoza, Byetta recent family stress, and has not been adherent with insulin or monitoring  Eye exam December 2022, no DR, report in attachments Foot exam: February 2023, +neuropathy Kidney disease: none Heart disease: none  Exercise: active at work, (meat manger, unpacks trucks) Diet: varies B- coffee with banana and yogurt L- skips D- fish or steaks with vegetables (salad, asparagus, spinach, broccoli) and rice S- donut, pudding, sugar free Jello Weight: recent weight loss, 10 pounds in the past 3-4 months  Past Medical History Cholesterol. ED (hypogonadism ruled out) fatty liver. FIB -4 1.1, low risk s/p COVID

## 2024-01-16 NOTE — ASSESSMENT
[FreeTextEntry1] : DM type 2, not well controlled due to stress and inattention. He has not been monitoring glucose and using insulin pre-meal. Can do better on the current regimen if followed. Will fetch a gatito report in a month and then evaluate. for now renew humalog and trulicity, but increase trulicity to 4.5 mg weekly  Glucose Sensor Necessity:  This patient with diabetes (dx: E11.65) has been using a gatito cgm. The patient is treated with insulin via four injections daily.  This patient requires frequent adjustments to his insulin treatment on the basis of therapeutic continuous glucose monitoring results by  adjusting fixed doses.  In addition, the patient has been to our office for an evaluation of his diabetes control within the past 6 months.

## 2024-03-07 LAB
HBA1C MFR BLD HPLC: 8.7
LDLC SERPL DIRECT ASSAY-MCNC: 48

## 2024-03-08 ENCOUNTER — APPOINTMENT (OUTPATIENT)
Dept: ENDOCRINOLOGY | Facility: CLINIC | Age: 69
End: 2024-03-08
Payer: COMMERCIAL

## 2024-03-08 VITALS
DIASTOLIC BLOOD PRESSURE: 66 MMHG | HEART RATE: 68 BPM | WEIGHT: 189 LBS | SYSTOLIC BLOOD PRESSURE: 112 MMHG | OXYGEN SATURATION: 97 % | HEIGHT: 66 IN | BODY MASS INDEX: 30.37 KG/M2

## 2024-03-08 LAB — GLUCOSE BLDC GLUCOMTR-MCNC: 148

## 2024-03-08 PROCEDURE — 82962 GLUCOSE BLOOD TEST: CPT

## 2024-03-08 PROCEDURE — 99214 OFFICE O/P EST MOD 30 MIN: CPT

## 2024-03-08 PROCEDURE — G2211 COMPLEX E/M VISIT ADD ON: CPT

## 2024-03-08 RX ORDER — INSULIN LISPRO 100 [IU]/ML
100 INJECTION, SOLUTION INTRAVENOUS; SUBCUTANEOUS
Qty: 3 | Refills: 1 | Status: ACTIVE | COMMUNITY
Start: 2023-01-20 | End: 1900-01-01

## 2024-03-08 RX ORDER — FLASH GLUCOSE SENSOR
KIT MISCELLANEOUS
Qty: 6 | Refills: 3 | Status: DISCONTINUED | COMMUNITY
Start: 2020-06-23 | End: 2024-03-08

## 2024-03-08 NOTE — HISTORY OF PRESENT ILLNESS
[FreeTextEntry1] : INTERVAL HISTORY: A lo tof life stressors. Son had 4V CABG 2023, great granddaughter in hospital for RSV, ex-wife passed away. Inattention to diabetes due to this.  Quality: Type 2. Severity: Moderate Duration: 30 Associated Symptoms: neuropathy  Modifying Factors: Current regimen: Metformin 500, 4 tabs daily Jardiance 25 mg Trulicity 4.5 mg Humalog before meals: 2-4 units before each meal, takes up to 15 units if he is eating a high starch meal (does not follow sliding scale)  6 150-199 10 200-249 12 250-299 16 300-349 20 350+ 24  Past meds: On Lantus in the past, d/c'ed when it was no longer need. Was on 35 units, eventually reduced to 25 units before d/c. Also on Glucotrol, Actos, Victoza, Byetta in the past.  Current B, fasting Infrequent SMBG with CGM. Reviewed download, limited data, only active 5% of the time.  Eye exam 2023, no DR, report in attachments Foot exam: 2024, +neuropathy, stable. Kidney disease: none Heart disease: none  Exercise: active at work, (meat manger, unpacks trucks) Diet: varies B- coffee with banana and yogurt L- skips D- fish or steaks with vegetables (salad, asparagus, spinach, broccoli) and rice S- donut, pudding, sugar free Jello Weight: gained a few pounds  Past Medical History Cholesterol. ED (hypogonadism ruled out) fatty liver. FIB -4 1.1, low risk s/p COVID

## 2024-03-08 NOTE — ASSESSMENT
[FreeTextEntry1] : 68 year old male with T2DM, hypertension, and hyperlipidemia. Glycemic control has worsened.  1. T2DM- A1c up to 8.7%, multiple life stressors making it difficult for patient to manage his diabetes. -Continue Metformin, Jardiance, and Trulicity. -Resume Lantus 10 units daily. -Continue Humalog 4 units AC plus sliding scale +2:50 > 150. -Resume SMBG with Freestyle Kevin. Will upgrade to Kevin 3.  -Reviewed importance of testing BG prior to giving insulin. -Work on lifestyle modifications- diet, exercise, and weight loss- to improve glycemic control. -RTO for CGM download in 6 weeks.  2. Hypertension- controlled. -Continue ACE inhibitor.  3. Hyperlipidemia- LDL at target, triglycerides elevated. -Continue statin. -Reviewed low fat diet. Triglycerides can improve with better glycemic control. -Repeat lipids in 3 months. ---------------------------------------------------------------  CGM STATEMENT This patient with diabetes: -Is testing blood glucose times per day. -Is injection insulin 3 or more times per day. -Has been seen in the office by a provider within the last six months. -Would benefit from use of CGM. -Is adjusting daily multi-dose insulin using a sliding scale or correction factor as documented in the medical record.  CGM is medically necessary for this patient. -CGM will improve/maintain A1c. -CGM will reduce hypoglycemia events.

## 2024-03-08 NOTE — PHYSICAL EXAM
[Alert] : alert [Well Nourished] : well nourished [No Acute Distress] : no acute distress [Well Developed] : well developed [EOMI] : extra ocular movement intact [Normal Sclera/Conjunctiva] : normal sclera/conjunctiva [No Proptosis] : no proptosis [Thyroid Not Enlarged] : the thyroid was not enlarged [No Thyroid Nodules] : no palpable thyroid nodules [No Respiratory Distress] : no respiratory distress [No Accessory Muscle Use] : no accessory muscle use [Clear to Auscultation] : lungs were clear to auscultation bilaterally [Normal Rate] : heart rate was normal [Normal S1, S2] : normal S1 and S2 [Regular Rhythm] : with a regular rhythm [No Edema] : no peripheral edema [Normal Anterior Cervical Nodes] : no anterior cervical lymphadenopathy [Oriented x3] : oriented to person, place, and time [Normal Affect] : the affect was normal [Normal Mood] : the mood was normal [Acanthosis Nigricans] : no acanthosis nigricans

## 2024-03-08 NOTE — REVIEW OF SYSTEMS
[Recent Weight Gain (___ Lbs)] : recent weight gain: [unfilled] lbs [Pain/Numbness of Digits] : pain/numbness of digits [Palpitations] : no palpitations [Chest Pain] : no chest pain [Shortness Of Breath] : no shortness of breath [Nausea] : no nausea [Vomiting] : no vomiting [Abdominal Pain] : no abdominal pain [Polyuria] : no polyuria [Polydipsia] : no polydipsia

## 2024-03-13 RX ORDER — INSULIN GLARGINE 100 [IU]/ML
100 INJECTION, SOLUTION SUBCUTANEOUS
Qty: 15 | Refills: 2 | Status: COMPLETED | COMMUNITY
Start: 2024-03-08 | End: 2024-03-13

## 2024-04-15 ENCOUNTER — APPOINTMENT (OUTPATIENT)
Dept: ORTHOPEDIC SURGERY | Facility: CLINIC | Age: 69
End: 2024-04-15
Payer: OTHER MISCELLANEOUS

## 2024-04-15 VITALS — BODY MASS INDEX: 30.37 KG/M2 | HEIGHT: 66 IN | WEIGHT: 189 LBS

## 2024-04-15 DIAGNOSIS — M25.811 OTHER SPECIFIED JOINT DISORDERS, RIGHT SHOULDER: ICD-10-CM

## 2024-04-15 PROCEDURE — 99455 WORK RELATED DISABILITY EXAM: CPT

## 2024-04-15 NOTE — HISTORY OF PRESENT ILLNESS
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 Pt reports pain levels remain the same.  [de-identified] : [employer].

## 2024-04-18 PROBLEM — M25.811 IMPINGEMENT OF RIGHT SHOULDER: Status: ACTIVE | Noted: 2023-08-07

## 2024-04-29 ENCOUNTER — APPOINTMENT (OUTPATIENT)
Dept: ORTHOPEDIC SURGERY | Facility: CLINIC | Age: 69
End: 2024-04-29
Payer: OTHER MISCELLANEOUS

## 2024-04-29 VITALS — HEIGHT: 68 IN | BODY MASS INDEX: 27.28 KG/M2 | WEIGHT: 180 LBS

## 2024-04-29 DIAGNOSIS — M25.812 OTHER SPECIFIED JOINT DISORDERS, LEFT SHOULDER: ICD-10-CM

## 2024-04-29 DIAGNOSIS — M75.02 ADHESIVE CAPSULITIS OF LEFT SHOULDER: ICD-10-CM

## 2024-04-29 PROCEDURE — 99455 WORK RELATED DISABILITY EXAM: CPT

## 2024-05-01 PROBLEM — M75.02 ADHESIVE CAPSULITIS OF LEFT SHOULDER: Status: ACTIVE | Noted: 2024-05-01

## 2024-05-01 PROBLEM — M25.812 IMPINGEMENT OF LEFT SHOULDER: Status: ACTIVE | Noted: 2023-07-20

## 2024-05-01 NOTE — ASSESSMENT
[FreeTextEntry1] : The patient comes in today for follow-up on his left shoulder.  He injured it on June 20, 2023 lifting a heavy object.  Over the Course of the last year he has done conservative management and has gotten markedly better.  He is currently working and having no symptoms.  He can do all activities without restriction.  His only complaint is that he is stiff particular going behind his back and over his head.  He has no pain at rest or at night.  No further treatment is necessary.  Examination of the left upper extremity reveals normal neurovascular exam.  Examination left shoulder reveals slight limitation of range of motion with forward elevation to 140 degrees, external rotation of 30 degrees, and internal rotation to 45 degrees.  This cannot be compared to his opposite side as the right shoulder is compromised from a previous work injury.  He has no pain or deformity over the AC joint.  He has minimal Neer and [place] impingement signs.  He has a negative speeds test and negative Hollywood sign he has no instability or apprehension.  At this point he has reached maximal medical improvement to the left shoulder with a schedule loss of use of 25%.  This is based on 10% loss for mild loss of forward flexion and an additional 15% loss for moderate loss of both internal and external rotation.  He has a previous Workmen's Compensation case over 5 years ago where he had a schedule loss of use of 20%.  This particular case adds 5% to his schedule loss of use.  His left shoulder pain is causally related to his work accident.  Will continue to work without restrictions.  I will see him back in the office as needed.

## 2024-05-01 NOTE — HISTORY OF PRESENT ILLNESS
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  [de-identified] : [employer].

## 2024-05-31 ENCOUNTER — RX RENEWAL (OUTPATIENT)
Age: 69
End: 2024-05-31

## 2024-05-31 RX ORDER — RAMIPRIL 5 MG/1
5 CAPSULE ORAL DAILY
Qty: 90 | Refills: 0 | Status: ACTIVE | COMMUNITY
Start: 2020-05-12 | End: 1900-01-01

## 2024-05-31 RX ORDER — ATORVASTATIN CALCIUM 40 MG/1
40 TABLET, FILM COATED ORAL
Qty: 90 | Refills: 0 | Status: ACTIVE | COMMUNITY
Start: 1900-01-01 | End: 1900-01-01

## 2024-05-31 RX ORDER — EMPAGLIFLOZIN 25 MG/1
25 TABLET, FILM COATED ORAL DAILY
Qty: 90 | Refills: 0 | Status: ACTIVE | COMMUNITY
Start: 2021-12-29 | End: 1900-01-01

## 2024-05-31 RX ORDER — METFORMIN HYDROCHLORIDE 500 MG/1
500 TABLET, COATED ORAL
Qty: 360 | Refills: 0 | Status: ACTIVE | COMMUNITY
Start: 2021-02-04 | End: 1900-01-01

## 2024-06-07 LAB
HBA1C MFR BLD HPLC: 8.8
LDLC SERPL DIRECT ASSAY-MCNC: 74
MICROALBUMIN/CREAT 24H UR-RTO: 21

## 2024-06-10 ENCOUNTER — APPOINTMENT (OUTPATIENT)
Dept: ENDOCRINOLOGY | Facility: CLINIC | Age: 69
End: 2024-06-10
Payer: COMMERCIAL

## 2024-06-10 VITALS
BODY MASS INDEX: 28.95 KG/M2 | DIASTOLIC BLOOD PRESSURE: 60 MMHG | WEIGHT: 191 LBS | OXYGEN SATURATION: 97 % | HEIGHT: 68 IN | SYSTOLIC BLOOD PRESSURE: 119 MMHG | HEART RATE: 63 BPM

## 2024-06-10 DIAGNOSIS — E78.00 PURE HYPERCHOLESTEROLEMIA, UNSPECIFIED: ICD-10-CM

## 2024-06-10 DIAGNOSIS — E11.65 TYPE 2 DIABETES MELLITUS WITH HYPERGLYCEMIA: ICD-10-CM

## 2024-06-10 DIAGNOSIS — I10 ESSENTIAL (PRIMARY) HYPERTENSION: ICD-10-CM

## 2024-06-10 LAB — GLUCOSE BLDC GLUCOMTR-MCNC: 236

## 2024-06-10 PROCEDURE — 82962 GLUCOSE BLOOD TEST: CPT

## 2024-06-10 PROCEDURE — 99214 OFFICE O/P EST MOD 30 MIN: CPT

## 2024-06-10 PROCEDURE — 95251 CONT GLUC MNTR ANALYSIS I&R: CPT

## 2024-06-10 PROCEDURE — G2211 COMPLEX E/M VISIT ADD ON: CPT

## 2024-06-10 RX ORDER — DULAGLUTIDE 4.5 MG/.5ML
4.5 INJECTION, SOLUTION SUBCUTANEOUS
Qty: 1 | Refills: 5 | Status: DISCONTINUED | COMMUNITY
Start: 2021-05-25 | End: 2024-06-10

## 2024-06-10 RX ORDER — INSULIN DEGLUDEC INJECTION 100 U/ML
100 INJECTION, SOLUTION SUBCUTANEOUS
Qty: 1 | Refills: 0 | Status: DISCONTINUED | COMMUNITY
Start: 2024-03-13 | End: 2024-06-10
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
## 2024-06-10 NOTE — ASSESSMENT
[FreeTextEntry1] : 68 year old male with T2DM, hypertension, and hyperlipidemia. Glycemic control remains suboptimal.  1. T2DM- A1c 8.8%, reports stress at work. CGMS reveals post prandial hyperglycemia, high normal between meals. -Can hold off on basal insulin for now. -Stop Trulicity due to shortage. Start Ozempic 0.25 mg weekly x 4 weeks then increase to 0.5 mg weekly. Since he has bene off Trulicity for close to 1 month, will titrate Ozempic every 4 weeks to max dose of 2 mg weekly as tolerated. Should help with post prandial hyperglycemia and may respond better than he did to Trulicity.  -Continue Metformin and Jardiance. -Continue Humalog 8 units before meals. Not following sliding scale. Can adjust dose based on response to Ozempic. -Continue SMBG with CGM but needs to test more frequently, at least once every 8 hours. Will upgrade to Kevin 3 when current supply of sensors run out in 3 months. -Reviewed importance of testing BG prior to giving insulin. -Work on lifestyle modifications- diet, exercise, and weight loss- to improve glycemic control. -HAI normal May 2024, UTD with eye exam. -Will download CGM in 1 month. -Repeat A1c and RTO for follow-up with MD In 3 months.  2. Hypertension- controlled. -Continue ACE inhibitor.  3. Hyperlipidemia- LDL at target. -Continue statin. -Repeat lipids in 3 months. ---------------------------------------------------------------  CGM STATEMENT This patient with diabetes: -Is testing blood glucose times per day. -Is injection insulin 3 or more times per day. -Has been seen in the office by a provider within the last six months. -Would benefit from use of CGM. -Is adjusting daily multi-dose insulin using a sliding scale or correction factor as documented in the medical record.  CGM is medically necessary for this patient. -CGM will improve/maintain A1c. -CGM will reduce hypoglycemia events.

## 2024-10-15 LAB
HBA1C MFR BLD HPLC: 11.1
LDLC SERPL DIRECT ASSAY-MCNC: 131
MICROALBUMIN/CREAT 24H UR-RTO: 19
TSH SERPL-ACNC: 2.1

## 2024-10-16 ENCOUNTER — APPOINTMENT (OUTPATIENT)
Dept: ENDOCRINOLOGY | Facility: CLINIC | Age: 69
End: 2024-10-16
Payer: MEDICARE

## 2024-10-16 VITALS
BODY MASS INDEX: 29.25 KG/M2 | HEIGHT: 68 IN | HEART RATE: 71 BPM | DIASTOLIC BLOOD PRESSURE: 70 MMHG | SYSTOLIC BLOOD PRESSURE: 120 MMHG | WEIGHT: 193 LBS | OXYGEN SATURATION: 97 %

## 2024-10-16 DIAGNOSIS — E11.65 TYPE 2 DIABETES MELLITUS WITH HYPERGLYCEMIA: ICD-10-CM

## 2024-10-16 DIAGNOSIS — E78.00 PURE HYPERCHOLESTEROLEMIA, UNSPECIFIED: ICD-10-CM

## 2024-10-16 LAB — GLUCOSE BLDC GLUCOMTR-MCNC: 314

## 2024-10-16 PROCEDURE — G2211 COMPLEX E/M VISIT ADD ON: CPT

## 2024-10-16 PROCEDURE — 99205 OFFICE O/P NEW HI 60 MIN: CPT

## 2024-10-16 PROCEDURE — 95251 CONT GLUC MNTR ANALYSIS I&R: CPT

## 2024-10-16 PROCEDURE — 82962 GLUCOSE BLOOD TEST: CPT

## 2024-10-16 RX ORDER — INSULIN GLARGINE 100 [IU]/ML
100 INJECTION, SOLUTION SUBCUTANEOUS DAILY
Qty: 1 | Refills: 3 | Status: ACTIVE | COMMUNITY
Start: 2024-10-16 | End: 1900-01-01

## 2024-11-20 ENCOUNTER — NON-APPOINTMENT (OUTPATIENT)
Age: 69
End: 2024-11-20

## 2024-11-20 ENCOUNTER — APPOINTMENT (OUTPATIENT)
Dept: ENDOCRINOLOGY | Facility: CLINIC | Age: 69
End: 2024-11-20
Payer: MEDICARE

## 2024-11-20 VITALS
DIASTOLIC BLOOD PRESSURE: 68 MMHG | HEART RATE: 79 BPM | SYSTOLIC BLOOD PRESSURE: 120 MMHG | BODY MASS INDEX: 28.49 KG/M2 | HEIGHT: 68 IN | WEIGHT: 188 LBS | OXYGEN SATURATION: 96 %

## 2024-11-20 DIAGNOSIS — I10 ESSENTIAL (PRIMARY) HYPERTENSION: ICD-10-CM

## 2024-11-20 DIAGNOSIS — E78.00 PURE HYPERCHOLESTEROLEMIA, UNSPECIFIED: ICD-10-CM

## 2024-11-20 DIAGNOSIS — E11.65 TYPE 2 DIABETES MELLITUS WITH HYPERGLYCEMIA: ICD-10-CM

## 2024-11-20 LAB — GLUCOSE BLDC GLUCOMTR-MCNC: 121

## 2024-11-20 PROCEDURE — 82962 GLUCOSE BLOOD TEST: CPT

## 2024-11-20 PROCEDURE — 99214 OFFICE O/P EST MOD 30 MIN: CPT

## 2024-11-20 PROCEDURE — 95251 CONT GLUC MNTR ANALYSIS I&R: CPT

## 2025-01-31 ENCOUNTER — APPOINTMENT (OUTPATIENT)
Dept: ENDOCRINOLOGY | Facility: CLINIC | Age: 70
End: 2025-01-31
Payer: MEDICARE

## 2025-01-31 ENCOUNTER — APPOINTMENT (OUTPATIENT)
Dept: ENDOCRINOLOGY | Facility: CLINIC | Age: 70
End: 2025-01-31

## 2025-01-31 VITALS
WEIGHT: 190 LBS | DIASTOLIC BLOOD PRESSURE: 80 MMHG | BODY MASS INDEX: 28.79 KG/M2 | HEIGHT: 68 IN | SYSTOLIC BLOOD PRESSURE: 130 MMHG

## 2025-01-31 DIAGNOSIS — I10 ESSENTIAL (PRIMARY) HYPERTENSION: ICD-10-CM

## 2025-01-31 DIAGNOSIS — E11.65 TYPE 2 DIABETES MELLITUS WITH HYPERGLYCEMIA: ICD-10-CM

## 2025-01-31 DIAGNOSIS — E78.00 PURE HYPERCHOLESTEROLEMIA, UNSPECIFIED: ICD-10-CM

## 2025-01-31 LAB — GLUCOSE BLDC GLUCOMTR-MCNC: 342

## 2025-01-31 PROCEDURE — 95251 CONT GLUC MNTR ANALYSIS I&R: CPT

## 2025-01-31 PROCEDURE — 95249 CONT GLUC MNTR PT PROV EQP: CPT

## 2025-01-31 PROCEDURE — 99214 OFFICE O/P EST MOD 30 MIN: CPT

## 2025-01-31 PROCEDURE — G2211 COMPLEX E/M VISIT ADD ON: CPT

## 2025-01-31 PROCEDURE — 82962 GLUCOSE BLOOD TEST: CPT

## 2025-01-31 RX ORDER — SEMAGLUTIDE 2.68 MG/ML
8 INJECTION, SOLUTION SUBCUTANEOUS
Qty: 9 | Refills: 1 | Status: ACTIVE | COMMUNITY
Start: 2025-01-31 | End: 1900-01-01

## 2025-02-20 ENCOUNTER — NON-APPOINTMENT (OUTPATIENT)
Age: 70
End: 2025-02-20

## 2025-02-21 DIAGNOSIS — E11.65 TYPE 2 DIABETES MELLITUS WITH HYPERGLYCEMIA: ICD-10-CM

## 2025-02-21 PROCEDURE — 95251 CONT GLUC MNTR ANALYSIS I&R: CPT

## 2025-05-19 LAB
HBA1C MFR BLD HPLC: 7.7
LDLC SERPL DIRECT ASSAY-MCNC: 29
MICROALBUMIN/CREAT 24H UR-RTO: 12
TSH SERPL-ACNC: 1.61

## 2025-05-21 ENCOUNTER — APPOINTMENT (OUTPATIENT)
Dept: ENDOCRINOLOGY | Facility: CLINIC | Age: 70
End: 2025-05-21
Payer: MEDICARE

## 2025-05-21 VITALS
HEIGHT: 68 IN | OXYGEN SATURATION: 98 % | DIASTOLIC BLOOD PRESSURE: 70 MMHG | WEIGHT: 181 LBS | SYSTOLIC BLOOD PRESSURE: 130 MMHG | BODY MASS INDEX: 27.43 KG/M2 | HEART RATE: 84 BPM

## 2025-05-21 DIAGNOSIS — E78.00 PURE HYPERCHOLESTEROLEMIA, UNSPECIFIED: ICD-10-CM

## 2025-05-21 DIAGNOSIS — E11.65 TYPE 2 DIABETES MELLITUS WITH HYPERGLYCEMIA: ICD-10-CM

## 2025-05-21 LAB — GLUCOSE BLDC GLUCOMTR-MCNC: 127

## 2025-05-21 PROCEDURE — 82962 GLUCOSE BLOOD TEST: CPT

## 2025-05-21 PROCEDURE — 95251 CONT GLUC MNTR ANALYSIS I&R: CPT

## 2025-05-21 PROCEDURE — G2211 COMPLEX E/M VISIT ADD ON: CPT

## 2025-05-21 PROCEDURE — 99214 OFFICE O/P EST MOD 30 MIN: CPT

## 2025-07-14 ENCOUNTER — RX RENEWAL (OUTPATIENT)
Age: 70
End: 2025-07-14